# Patient Record
(demographics unavailable — no encounter records)

---

## 2025-01-17 NOTE — CONSULT LETTER
[Dear  ___] : Dear  [unfilled], [Courtesy Letter:] : I had the pleasure of seeing your patient, [unfilled], in my office today. [Sincerely,] : Sincerely, [FreeTextEntry3] : Rusty Miles MD, PhD, FRCPSC  Attending Neurosurgeon   of Neurosurgery  [facility]  [address], [address]nd [place] [address]  Office: [phone]  Fax: [phone]

## 2025-01-18 NOTE — HISTORY OF PRESENT ILLNESS
[Home] : at home, [unfilled] , at the time of the visit. [Other Location: e.g. Home (Enter Location, City,State)___] : at [unfilled] [Verbal consent obtained from patient] : the patient, [unfilled] [FreeTextEntry1] : f/u post hospitalization at  from  1/1-9 for placement.  [de-identified] : Brief Hospital Course copied: 41 year old male with ADD, Polysubstance abuse, recent prolonged stay at  for Right sided ICH sp emergent craniotomy and decompression, subsequent hygroma with evacuation 11/21, found to have a surgical site hematoma sp needle aspiration found to be growing Enterobacter, currently on Meropenem, SP peg placement, and insertion of  shunt for NPH on 12/26 was discharged to [facility] on 12/30 but was refused acceptance once he got there, stating he is walking to well to be admitted to a SNF. The patient unfortunately has nowhere to go so he was sent back to  for placement. Currently patient with baseline confusion, oriented x 2. No fever.  Telephone visit with pt he is awake and alert. Visit done by telephone as pt refused video visit stating it has to be quick, agreed for telephone visit. States he is still having chest pains and headaches. Pt had shunt placed for Acquired Obstructive Hydrocephalus with intracranial hemorrhage, s/p fall. He was seen in the ER on 1/15 for same. Labs done with no changes, shunt placement intact. No swelling or clog noted at the site. Pt was instructed to f/u with his NEURO SURG. Appt is for 1/22.  Pt reports unsteady gait and  balance, uses standard walker. Instructed to maintain safety, ask for assistance. To use a shower chair when showering - just got one.  He reports appetite varies. Encourages him to eat soft foods, that are easy to chew and swallow. To maintain adequate fluid intake. To alternate periods of activity

## 2025-01-18 NOTE — ASSESSMENT
[FreeTextEntry1] :  for Right sided ICH sp emergent craniotomy and decompression, subsequent hygroma with evacuation 11/21, found to have a surgical site hematoma sp needle aspiration found to be growing Enterobacter, currently on Meropenem, SP peg placement, and insertion of  shunt for NPH on 12/26 was discharged to [facility] on 12/30 but was refused acceptance once he got there, stating he is walking to well to be admitted to a SNF. The patient unfortunately has nowhere to go so he was sent back to  for placement. Currently patient with baseline confusion, oriented x 2. No fever.

## 2025-01-18 NOTE — PLAN
[FreeTextEntry1] : 1. cont all medications as prescribed 2. keep f/u appt with NEURO SURG 3. maintain fall precautions 4. maintain adequate nutrition and hydration MalePresents to emergency with complaints ofone point pain on the right side On examination patient is laying comfortably on his abdomen and sleepingOutpatient follow-up with surgery.Recommended

## 2025-01-18 NOTE — HISTORY OF PRESENT ILLNESS
[Home] : at home, [unfilled] , at the time of the visit. [Other Location: e.g. Home (Enter Location, City,State)___] : at [unfilled] [Verbal consent obtained from patient] : the patient, [unfilled] [FreeTextEntry1] : f/u post hospitalization at  from  1/1-9 for placement.  [de-identified] : Brief Hospital Course copied: 41 year old male with ADD, Polysubstance abuse, recent prolonged stay at  for Right sided ICH sp emergent craniotomy and decompression, subsequent hygroma with evacuation 11/21, found to have a surgical site hematoma sp needle aspiration found to be growing Enterobacter, currently on Meropenem, SP peg placement, and insertion of  shunt for NPH on 12/26 was discharged to [facility] on 12/30 but was refused acceptance once he got there, stating he is walking to well to be admitted to a SNF. The patient unfortunately has nowhere to go so he was sent back to  for placement. Currently patient with baseline confusion, oriented x 2. No fever.  Telephone visit with pt he is awake and alert. Visit done by telephone as pt refused video visit stating it has to be quick, agreed for telephone visit. States he is still having chest pains and headaches. Pt had shunt placed for Acquired Obstructive Hydrocephalus with intracranial hemorrhage, s/p fall. He was seen in the ER on 1/15 for same. Labs done with no changes, shunt placement intact. No swelling or clog noted at the site. Pt was instructed to f/u with his NEURO SURG. Appt is for 1/22.  Pt reports unsteady gait and  balance, uses standard walker. Instructed to maintain safety, ask for assistance. To use a shower chair when showering - just got one.  He reports appetite varies. Encourages him to eat soft foods, that are easy to chew and swallow. To maintain adequate fluid intake. To alternate periods of activity

## 2025-01-18 NOTE — PLAN
[FreeTextEntry1] : 1. cont all medications as prescribed 2. keep f/u appt with NEURO SURG 3. maintain fall precautions 4. maintain adequate nutrition and hydration

## 2025-01-18 NOTE — ASSESSMENT
[FreeTextEntry1] :  for Right sided ICH sp emergent craniotomy and decompression, subsequent hygroma with evacuation 11/21, found to have a surgical site hematoma sp needle aspiration found to be growing Enterobacter, currently on Meropenem, SP peg placement, and insertion of  shunt for NPH on 12/26 was discharged to [facility] on 12/30 but was refused acceptance once he got there, stating he is walking to well to be admitted to a SNF. The patient unfortunately has nowhere to go so he was sent back to  for placement. Currently patient with baseline confusion, oriented x 2. No fever.

## 2025-01-18 NOTE — HISTORY OF PRESENT ILLNESS
[Home] : at home, [unfilled] , at the time of the visit. [Other Location: e.g. Home (Enter Location, City,State)___] : at [unfilled] [Verbal consent obtained from patient] : the patient, [unfilled] [FreeTextEntry1] : f/u post hospitalization at  from  1/1-9 for placement.  [de-identified] : Brief Hospital Course copied: 41 year old male with ADD, Polysubstance abuse, recent prolonged stay at  for Right sided ICH sp emergent craniotomy and decompression, subsequent hygroma with evacuation 11/21, found to have a surgical site hematoma sp needle aspiration found to be growing Enterobacter, currently on Meropenem, SP peg placement, and insertion of  shunt for NPH on 12/26 was discharged to [facility] on 12/30 but was refused acceptance once he got there, stating he is walking to well to be admitted to a SNF. The patient unfortunately has nowhere to go so he was sent back to  for placement. Currently patient with baseline confusion, oriented x 2. No fever.  Telephone visit with pt he is awake and alert. Visit done by telephone as pt refused video visit stating it has to be quick, agreed for telephone visit. States he is still having chest pains and headaches. Pt had shunt placed for Acquired Obstructive Hydrocephalus with intracranial hemorrhage, s/p fall. He was seen in the ER on 1/15 for same. Labs done with no changes, shunt placement intact. No swelling or clog noted at the site. Pt was instructed to f/u with his NEURO SURG. Appt is for 1/22.  Pt reports unsteady gait and  balance, uses standard walker. Instructed to maintain safety, ask for assistance. To use a shower chair when showering - just got one.  He reports appetite varies. Encourages him to eat soft foods, that are easy to chew and swallow. To maintain adequate fluid intake. To alternate periods of activity

## 2025-01-18 NOTE — COUNSELING
[Fall prevention counseling provided] : Fall prevention counseling provided [Use proper foot wear] : Use proper foot wear [Use recommended devices] : Use recommended devices [None] : None [Good understanding] : Patient has a good understanding of lifestyle changes and steps needed to achieve self management goal

## 2025-01-18 NOTE — REVIEW OF SYSTEMS
[Constipation] : constipation [Muscle Weakness] : muscle weakness [Unsteady Walk] : ataxia [Negative] : Heme/Lymph [Fever] : no fever [Chills] : no chills [Fatigue] : no fatigue [Vision Problems] : no vision problems [Hearing Loss] : no hearing loss [Chest Pain] : no chest pain [Lower Ext Edema] : no lower extremity edema [Shortness Of Breath] : no shortness of breath [Cough] : no cough [Incontinence] : no incontinence [Joint Pain] : no joint pain [Muscle Pain] : no muscle pain [Insomnia] : no insomnia

## 2025-01-18 NOTE — ASSESSMENT
[FreeTextEntry1] :  for Right sided ICH sp emergent craniotomy and decompression, subsequent hygroma with evacuation 11/21, found to have a surgical site hematoma sp needle aspiration found to be growing Enterobacter, currently on Meropenem, SP peg placement, and insertion of  shunt for NPH on 12/26 was discharged to [facility] on 12/30 but was refused acceptance once he got there, stating he is walking to well to be admitted to a SNF. The patient unfortunately has nowhere to go so he was sent back to  for placement. Currently patient with baseline confusion, oriented x 2. No fever.

## 2025-02-10 NOTE — PHYSICAL EXAM
[de-identified] : NAD [de-identified] : Head wrap in place with area of sanguineous strikethrough at right pre auricular region.  Head wrap removed with incision healing well with sutures in place.  <1cm area at anterior most incision with small break in the skin from bumping his head.  No erythema, dehiscence, drainage, or signs of infection noted  [de-identified] : Unlabored breathing

## 2025-02-10 NOTE — ASSESSMENT
[FreeTextEntry1] : Mr. Linares is a 42 year old male with history of polysubstance abuse who was recently admitted to [facility] from 11/7/24-12/31/24. During this time he underwent a right craniotomy for evacuation of ICH, revision of R craniotomy with evacuation of subdural hygroma, R temporal needle drainage of IC collection, and L frontal EVD insertion. He then presented to the emergency room complaining of a small opening in his R craniotomy incision with serous drainage. HCT, CT A/P, and brain MRI all consistent with superficial collection. He is now s/p right scalp wound revision, washout, debridement, and local tissue rearrangement for closure in conjunction with Dr. Miles from Neurosurgery on 1/28/25.  He reports doing well since discharge and pain has been well controlled.  His mother reports that he has bumped his head a few times at home causing some bleeding from the incision. Incision healing well with small break in skin at anterior most incision where he bumped his head.  Otherwise incision is healing well, with no dehiscence or signs of infection.  Continue to keep incision clean and dry.  Okay to shower and pat incision dry (do not scrub incisions).  Keep head covered since he has been bumping his head and causing the area to bleed - this will give another layer of protection.  Follow up in 1 month.  All his questions were answered.

## 2025-02-10 NOTE — HISTORY OF PRESENT ILLNESS
[FreeTextEntry1] : Mr. Linares is a 42 year old male with history of polysubstance abuse who was recently admitted to [facility] from 11/7/24-12/31/24. During this time he underwent a right craniotomy for evacuation of ICH, revision of R craniotomy with evacuation of subdural hygroma, R temporal needle drainage of IC collection, and L frontal EVD insertion. He then presented to the emergency room complaining of a small opening in his R craniotomy incision with serous drainage. HCT, CT A/P, and brain MRI all consistent with superficial collection. He is now s/p right scalp wound revision, washout, debridement, and local tissue rearrangement for closure in conjunction with Dr. Miles from Neurosurgery on 1/28/25.  He reports doing well since discharge and pain has been well controlled.  His mother reports that he has bumped his head a few times at home causing some bleeding from the incision.  Denies fevers, chills, drainage, redness.

## 2025-02-12 NOTE — HISTORY OF PRESENT ILLNESS
[FreeTextEntry1] : Anthony is a 40-year-old male who is presenting for follow-up visit after being seen in the hospital by our service for PEG tube placement.  He has a history of decompensated cirrhosis with prior variceal bleeding.  He had a prolonged hospitalization after head trauma with brain bleed.  He has recovered and his PEG tube was subsequently removed prior to discharge from the hospital 2 weeks prior.  He is present in the office with his mom who helps to provide additional history.  His mom mention that she is concerned about his ammonia level.  He was on lactulose but felt that this was causing him a lot of diarrhea so he does not take it very often.  His issues with loose stools have predated his cirrhosis diagnosis.  He is also taking rifaximin for hepatic encephalopathy.  He takes propranolol 10 mg 3 times daily for variceal prophylaxis.  His mother was concerned about some redness around the site of the PEG tube where it was removed.  Appetite since discharge has been slowly improving.  Patient still with some residual lightheadedness and dizziness after neurosurgery.

## 2025-02-12 NOTE — REVIEW OF SYSTEMS
[Feeling Poorly] : feeling poorly [Feeling Tired] : feeling tired [Diarrhea] : diarrhea [Dizziness] : dizziness [Negative] : Endocrine [Fever] : no fever [Chills] : no chills [Recent Weight Gain (___ Lbs)] : no recent weight gain [Recent Weight Loss (___ Lbs)] : no recent weight loss [Abdominal Pain] : no abdominal pain [Vomiting] : no vomiting [Constipation] : no constipation [Heartburn] : no heartburn [Melena (black stool)] : no melena [Bleeding] : no bleeding [Fecal Incontinence (soiling)] : no fecal incontinence [Bloating (gassiness)] : no bloating [Confused] : no confusion [Convulsions] : no convulsions [Fainting] : no fainting [Limb Weakness] : no limb weakness [Difficulty Walking] : no difficulty walking

## 2025-02-12 NOTE — PHYSICAL EXAM
[Normal] : normal bowel sounds, non-tender, no masses, soft, no no hepato-splenomegaly [Oriented To Time, Place, And Person] : oriented to person, place, and time [de-identified] : Site of Peg with normal healing

## 2025-02-12 NOTE — ASSESSMENT
[FreeTextEntry1] : Oliver is a 42-year-old male who is presenting for a follow-up visit for history of decompensated cirrhosis.   1.  Decompensated alcoholic cirrhosis: With prior history of variceal bleeding and hepatic encephalopathy.  Patient is on rifaximin and due to diarrhea was not regularly taking lactulose.  Is also on propranolol for secondary prophylaxis for varices.  -Continue rifaximin, counseled that if lactulose was causing significant diarrhea could switch to MiraLAX.  Goal is for 2-3 soft bowel movements per day -Counseled on stages of encephalopathy for patient's mother to be aware -Continue propranolol for prophylaxis  -Will benefit from repeat EGD but discussed with patient and family to hold off until further recovers from recent prolonged hospitalization -Counseled on the importance of continued abstinence -High-protein diet  2.  History of variceal bleeding: Was admitted at outside hospital for hematemesis and found to have esophageal varices status post banding.  No further episodes of bleeding since that episode.  Did have follow-up endoscopy in the hospital when PEG tube was placed.  -Will benefit from continued use of propranolol for secondary prophylaxis -Will discuss repeat EGD at follow-up visit  3. Hepatic Encephalopathy -Continue rifaximin -Ok to switch to miralax - titrate to 2-3 soft BM per day  4. Discomfort around PEG site: peg removed in hospital, site with appropriate granulation tissue and healing -Supportive care, can keep bandage over site as continues to heal  Follow up in 3 months

## 2025-02-21 NOTE — CONSULT LETTER
[Dear  ___] : Dear  [unfilled], [Courtesy Letter:] : I had the pleasure of seeing your patient, [unfilled], in my office today. [Sincerely,] : Sincerely, [FreeTextEntry1] : Anthony Linares is a 42-year-old male who presents today for a follow-up visit.  Patient has a long complicated surgical history.  Patient has history of polysubstance abuse including EtOH.  Patient underwent a right craniotomy for evacuation for intracerebral hemorrhage on 11/8/2025 followed by revision of the right craniotomy with evacuation of subdural hygroma on 11/21/2024.  He then underwent a right temporal needle drainage of collection on 11/29/2024.  Cultures revealed positive Enterobacter/Enterococcus.  He then underwent a left frontal EVD insertion on 12/2/2024 with a change of the EVD on 12/14/2024.  He finally underwent a  shunt placement on 12/26/2024.  Patient has a Certa shunt set at 6.  Patient was also recently seen in the hospital for small opening in the right craniotomy incision site.  He had underwent wound revision, washout debridement and closure on 1/28/2025.  He was recently evaluated by his plastic surgeon for a small opening at the previous site.  As per plastic office note patient will be scheduled for possible washout and closure of the incision next week.  Patient presents today with his mother with concerns regarding bilateral leg swelling and diarrhea.  Bilateral leg swelling started approximately 5 days ago.  Patient indicates he has history of bilateral leg swelling however this has worsened.  He denies any chest pain or shortness of breath at this time.   Denies calf pain.  Patient also endorses diarrhea.  Mom indicates patient has been taking lactulose.  They will contact GI in regards to this symptom.  Patient also with recent history of PEG placement.  Patient denies nausea or vomiting.  Patient reports difficulties with short-term memory.  He reports worsening gait and difficulties using stairs.  He reports headaches primarily in the morning.  He reports these have remained stable.  He takes Motrin with temporary benefit.  He reports difficulties with peripheral vision which has been chronic.  He is scheduled to see an ophthalmologist.  He reports generalized weakness.  Patient denies urine incontinence.  He denies fevers.  He reports occasional chills.  He reports dizziness.  Patient is alert and oriented.  No distress noted.  Cranial nerves intact.  EOMI.  PERRL.  Speech intact.  Strength to bilateral upper and lower extremities 5/5.  Negative Romberg's.  Negative pronator drift.  Certa shunt confirmed at 6.  Abdomen nontender.  At this time I have not arranged for a follow-up.  Plastics has been in contact with Dr. Miles regarding suggested washout and closure procedure to be performed in the near future.  Patient and mom aware to call with any further questions or concerns or with any new or worsening symptoms.  I have also recommended GI in regards to diarrhea.  I have also recommended primary care doctor for his bilateral leg swelling.  Patient aware of signs and symptoms that would warrant an ER visit. [FreeTextEntry3] :  Erika Kim, MSN, [facility]-BC Nurse Practitioner Neurosurgery [address]nd [place] [address] Office: [phone] Fax: [phone] No

## 2025-02-21 NOTE — CONSULT LETTER
[Dear  ___] : Dear  [unfilled], [Courtesy Letter:] : I had the pleasure of seeing your patient, [unfilled], in my office today. [Sincerely,] : Sincerely, [FreeTextEntry1] : Anthony Linares is a 42-year-old male who presents today for a follow-up visit.  Patient has a long complicated surgical history.  Patient has history of polysubstance abuse including EtOH.  Patient underwent a right craniotomy for evacuation for intracerebral hemorrhage on 11/8/2025 followed by revision of the right craniotomy with evacuation of subdural hygroma on 11/21/2024.  He then underwent a right temporal needle drainage of collection on 11/29/2024.  Cultures revealed positive Enterobacter/Enterococcus.  He then underwent a left frontal EVD insertion on 12/2/2024 with a change of the EVD on 12/14/2024.  He finally underwent a  shunt placement on 12/26/2024.  Patient has a Certa shunt set at 6.  Patient was also recently seen in the hospital for small opening in the right craniotomy incision site.  He had underwent wound revision, washout debridement and closure on 1/28/2025.  He was recently evaluated by his plastic surgeon for a small opening at the previous site.  As per plastic office note patient will be scheduled for possible washout and closure of the incision next week.  Patient presents today with his mother with concerns regarding bilateral leg swelling and diarrhea.  Bilateral leg swelling started approximately 5 days ago.  Patient indicates he has history of bilateral leg swelling however this has worsened.  He denies any chest pain or shortness of breath at this time.   Denies calf pain.  Patient also endorses diarrhea.  Mom indicates patient has been taking lactulose.  They will contact GI in regards to this symptom.  Patient also with recent history of PEG placement.  Patient denies nausea or vomiting.  Patient reports difficulties with short-term memory.  He reports worsening gait and difficulties using stairs.  He reports headaches primarily in the morning.  He reports these have remained stable.  He takes Motrin with temporary benefit.  He reports difficulties with peripheral vision which has been chronic.  He is scheduled to see an ophthalmologist.  He reports generalized weakness.  Patient denies urine incontinence.  He denies fevers.  He reports occasional chills.  He reports dizziness.  Patient is alert and oriented.  No distress noted.  Cranial nerves intact.  EOMI.  PERRL.  Speech intact.  Strength to bilateral upper and lower extremities 5/5.  Negative Romberg's.  Negative pronator drift.  Certa shunt confirmed at 6.  Abdomen nontender.  At this time I have not arranged for a follow-up.  Plastics has been in contact with Dr. Miles regarding suggested washout and closure procedure to be performed in the near future.  Patient and mom aware to call with any further questions or concerns or with any new or worsening symptoms.  I have also recommended GI in regards to diarrhea.  I have also recommended primary care doctor for his bilateral leg swelling.  Patient aware of signs and symptoms that would warrant an ER visit. [FreeTextEntry3] :  Erika Kim, MSN, [facility]-BC Nurse Practitioner Neurosurgery [address]nd [place] [address] Office: [phone] Fax: [phone]

## 2025-02-24 NOTE — HISTORY OF PRESENT ILLNESS
[FreeTextEntry1] : Mr. Linares is a 42 year old male with history of polysubstance abuse who was recently admitted to [facility] from 11/7/24-12/31/24. During this time he underwent a right craniotomy for evacuation of ICH, revision of R craniotomy with evacuation of subdural hygroma, R temporal needle drainage of IC collection, and L frontal EVD insertion. He then presented to the emergency room complaining of a small opening in his R craniotomy incision with serous drainage. HCT, CT A/P, and brain MRI all consistent with superficial collection. He is now s/p right scalp wound revision, washout, debridement, and local tissue rearrangement for closure in conjunction with Dr. Miles from Neurosurgery on 1/28/25. He presents for follow up. His mother reports that he has bumped his head a few times at home causing some bleeding from the incision.  She also reports that a small area of the incision this is getting thin.  She has been changing the head wrap daily.  Denies fevers, chills, drainage, redness.

## 2025-02-24 NOTE — PHYSICAL EXAM
[de-identified] : NAD [de-identified] : Head wrap removed with incision healing, <1cm area of thinning skin at previous dehiscence site, no erythema or purulence or signs of infection.  Anterior incision with some scabbing but mostly healed, no purulence.    [de-identified] : Unlabored breathing

## 2025-02-24 NOTE — HISTORY OF PRESENT ILLNESS
[FreeTextEntry1] : Mr. Linares is a 42 year old male with history of polysubstance abuse who was recently admitted to [facility] from 11/7/24-12/31/24. During this time he underwent a right craniotomy for evacuation of ICH, revision of R craniotomy with evacuation of subdural hygroma, R temporal needle drainage of IC collection, and L frontal EVD insertion. He then presented to the emergency room complaining of a small opening in his R craniotomy incision with serous drainage. HCT, CT A/P, and brain MRI all consistent with superficial collection. He is now s/p right scalp wound revision, washout, debridement, and local tissue rearrangement for closure in conjunction with Dr. Miles from Neurosurgery on 1/28/25. He presents for follow up. His mother reports that he has bumped his head a few times at home causing some bleeding from the incision.  She also reports that a small area of the incision has started to open up.  She has been changing the head wrap daily.  Denies fevers, chills, drainage, redness.    Patient and mother also report bilateral leg swelling for the past week.  This was noticed at PST this morning, and patient told to go to ED, but wanted to come to this appointment first.  Mother also reports patient has been consuming a lot of alcohol at home.

## 2025-02-24 NOTE — PHYSICAL EXAM
[de-identified] : NAD [de-identified] : Head wrap removed with incision healing, <1cm opening at previous dehiscence site, no erythema or purulence or signs of infection.  Anterior incision with some scabbing but mostly healed, no purulence.    [de-identified] : Unlabored breathing

## 2025-02-24 NOTE — PHYSICAL EXAM
[de-identified] : NAD [de-identified] : Head wrap removed with incision healing, <1cm area of thinning skin at previous dehiscence site, no erythema or purulence or signs of infection.  Anterior incision with some scabbing but mostly healed, no purulence.    [de-identified] : Unlabored breathing

## 2025-02-24 NOTE — ASSESSMENT
[FreeTextEntry1] : Mr. Linares is a 42 year old male with history of polysubstance abuse who was recently admitted to [facility] from 11/7/24-12/31/24. During this time he underwent a right craniotomy for evacuation of ICH, revision of R craniotomy with evacuation of subdural hygroma, R temporal needle drainage of IC collection, and L frontal EVD insertion. He then presented to the emergency room complaining of a small opening in his R craniotomy incision with serous drainage. HCT, CT A/P, and brain MRI all consistent with superficial collection. He is now s/p right scalp wound revision, washout, debridement, and local tissue rearrangement for closure in conjunction with Dr. Miles from Neurosurgery on 1/28/25. His mother reports that he has bumped his head a few times at home causing some bleeding from the incision. Incision with small <1cm opening at previous area of dehiscence, no purulence or signs of infection. Continue to keep incision clean and dry.  Patient is planning to see Dr. Miles tomorrow.  Will discuss with Dr. Miles and plan for possible washout and closure within the next week.  Patient to call or come in if opening gets bigger or any signs of infection.  All questions were answered.

## 2025-02-24 NOTE — ASSESSMENT
[FreeTextEntry1] : Mr. Linares is a 42 year old male with history of polysubstance abuse who was recently admitted to [facility] from 11/7/24-12/31/24. During this time he underwent a right craniotomy for evacuation of ICH, revision of R craniotomy with evacuation of subdural hygroma, R temporal needle drainage of IC collection, and L frontal EVD insertion. He then presented to the emergency room complaining of a small opening in his R craniotomy incision with serous drainage. HCT, CT A/P, and brain MRI all consistent with superficial collection. He is now s/p right scalp wound revision, washout, debridement, and local tissue rearrangement for closure in conjunction with Dr. Miles from Neurosurgery on 1/28/25. His mother reports that he has bumped his head a few times at home causing some bleeding from the incision. Incision with small <1cm opening at previous area of dehiscence, no purulence or signs of infection. Continue to keep incision clean and dry.  Patient is planning to see Dr. Miles tomorrow.  Will discuss with Dr. Miles and plan for possible washout and closure within the next week.  Patient to call or come in if opening gets bigger or any signs of infection.  All questions were answered.

## 2025-02-24 NOTE — HISTORY OF PRESENT ILLNESS
Known [FreeTextEntry1] : Mr. Linares is a 42 year old male with history of polysubstance abuse who was recently admitted to [facility] from 11/7/24-12/31/24. During this time he underwent a right craniotomy for evacuation of ICH, revision of R craniotomy with evacuation of subdural hygroma, R temporal needle drainage of IC collection, and L frontal EVD insertion. He then presented to the emergency room complaining of a small opening in his R craniotomy incision with serous drainage. HCT, CT A/P, and brain MRI all consistent with superficial collection. He is now s/p right scalp wound revision, washout, debridement, and local tissue rearrangement for closure in conjunction with Dr. Miles from Neurosurgery on 1/28/25. He presents for follow up. His mother reports that he has bumped his head a few times at home causing some bleeding from the incision.  She also reports that a small area of the incision this is getting thin.  She has been changing the head wrap daily.  Denies fevers, chills, drainage, redness.

## 2025-03-03 NOTE — HISTORY OF PRESENT ILLNESS
[FreeTextEntry1] : Mr. Linares is a 42 year old male with history of polysubstance abuse who was recently admitted to [facility] from 11/7/24-12/31/24. During this time he underwent a right craniotomy for evacuation of ICH, revision of R craniotomy with evacuation of subdural hygroma, R temporal needle drainage of IC collection, and L frontal EVD insertion. He then presented to the emergency room complaining of a small opening in his R craniotomy incision with serous drainage. HCT, CT A/P, and brain MRI all consistent with superficial collection. He is now s/p right scalp wound revision, washout, debridement, and local tissue rearrangement for closure in conjunction with Dr. Miles from Neurosurgery on 1/28/25. His course was complicated by dehiscence at the same area, which corresponded with one of the craniotomy plates, and he is now s/p right scalp wound revision, washout, hardware removal and replacement, and local tissue rearrangement.  He presents today because his mother was worried about some bleeding from the incision she noticed this morning.  Denies fevers, chills, drainage, redness.

## 2025-03-03 NOTE — PHYSICAL EXAM
[de-identified] : NAD [de-identified] : Head wrap removed with small area of blood noted on headwrap.  Incision healing well with sutures in place, no bleeding noted from the incision, no areas of dehiscence or opening.  No signs of infection.     [de-identified] : Unlabored breathing

## 2025-03-03 NOTE — ASSESSMENT
[FreeTextEntry1] : Mr. Linares is a 42 year old male with history of polysubstance abuse who was recently admitted to [facility] from 11/7/24-12/31/24. During this time he underwent a right craniotomy for evacuation of ICH, revision of R craniotomy with evacuation of subdural hygroma, R temporal needle drainage of IC collection, and L frontal EVD insertion. He then presented to the emergency room complaining of a small opening in his R craniotomy incision with serous drainage. HCT, CT A/P, and brain MRI all consistent with superficial collection. He is now s/p right scalp wound revision, washout, debridement, and local tissue rearrangement for closure in conjunction with Dr. Miles from Neurosurgery on 1/28/25. His course was complicated by dehiscence at the same area, which corresponded with one of the craniotomy plates, and he is now s/p right scalp wound revision, washout, hardware removal and replacement, and local tissue rearrangement.  He presents today because his mother was worried about some bleeding from the incision she noticed this morning.  Denies fevers, chills, drainage, redness.    The incision is healing well no signs of bleeding, no areas of dehiscence, of signs of infection.  Patient is okay to shower and let warm water wash over the incision, no scrubbing the incision, pat it dry.  Replace dressing and head wrap after showering.  Follow up in 1 week or sooner if any concerns for infection, bleeding, or areas of dehiscence.  All questions were answered, and patient and mother are in agreement with the plan.

## 2025-03-11 NOTE — ASSESSMENT
[FreeTextEntry1] : 42 M presented for follow up visit.   1. Decompensated alcoholic cirrhosis: With prior history of variceal bleeding and hepatic encephalopathy. Patient is on rifaximin and due to diarrhea was not regularly taking lactulose. Is also on propranolol for secondary prophylaxis for varices. -Continue rifaximin, counseled that if lactulose was causing significant diarrhea could switch to MiraLAX. Goal is for 2-3 soft bowel movements per day -Counseled on stages of encephalopathy for patient's mother to be aware -Continue propranolol for prophylaxis -Will benefit from repeat EGD but discussed with patient and family to hold off until further recovers from recent prolonged hospitalization -Counseled on the importance of continued abstinence -High-protein diet -continue diuretics for peripheral edema -Low Na Diet  2. History of variceal bleeding: Was admitted at outside hospital for hematemesis and found to have esophageal varices status post banding. No further episodes of bleeding since that episode. Did have follow-up endoscopy in the hospital when PEG tube was placed. -Will benefit from continued use of propranolol for secondary prophylaxis -Will discuss repeat EGD at follow-up visit  3. Hepatic Encephalopathy -Continue rifaximin -Ok to switch to miralax - titrate to 2-3 soft BM per day

## 2025-03-11 NOTE — HISTORY OF PRESENT ILLNESS
[FreeTextEntry1] : Anthony is a 40-year-old male who is presenting for follow-up visit after being seen in the hospital by our service for PEG tube placement. He has a history of decompensated cirrhosis with prior variceal bleeding. He had a prolonged hospitalization after head trauma with brain bleed. He has recovered and his PEG tube was subsequently removed prior to discharge from the hospital. He followed up after hospitalization with concern for poor appetite and diarrhea. Recommended holding off on lactulose - okay to use miralax BID for encephalopathy and continue rifaximin at that time. Some redness around peg site appeared to be normal granulation tissue. Since last visit he had another hospitalization for hepatic encephalopathy - found to have a GI infection. He was subsequently treated with antibiotics and discharged home. He is present with mom at the office visit who helps to provide additional history. His mental status has overall improved. He is on diuretics which help with lower extremity edema. He has not had an EGD since his initial episode of variceal bleeding. 
Statement Selected

## 2025-03-12 NOTE — PHYSICAL EXAM
[de-identified] : NAD [de-identified] : Head wrap removed with incision healing well with sutures in place, no bleeding noted from the incision, no areas of dehiscence or opening.  No signs of infection.     [de-identified] : Unlabored breathing

## 2025-03-12 NOTE — ASSESSMENT
[FreeTextEntry1] : Mr. Linares is a 42 year old male with history of polysubstance abuse who was recently admitted to [facility] from 11/7/24-12/31/24. During this time he underwent a right craniotomy for evacuation of ICH, revision of R craniotomy with evacuation of subdural hygroma, R temporal needle drainage of IC collection, and L frontal EVD insertion. He then presented to the emergency room complaining of a small opening in his R craniotomy incision with serous drainage. HCT, CT A/P, and brain MRI all consistent with superficial collection. He is now s/p right scalp wound revision, washout, debridement, and local tissue rearrangement for closure in conjunction with Dr. Miles from Neurosurgery on 1/28/25. His course was complicated by dehiscence at the same area, which corresponded with one of the craniotomy plates, and he is now s/p right scalp wound revision, washout, hardware removal and replacement, and local tissue rearrangement (2/25/25).   The incision is healing well no signs of bleeding, no areas of dehiscence, or signs of infection.  Every other suture was removed today.  Patient is okay to shower and let warm water wash over the incision, no scrubbing the incision, pat it dry.  Replace dressing and head wrap after showering.  Follow up in 1 week or sooner if any concerns for infection, bleeding, or areas of dehiscence.  All questions were answered, and patient and mother are in agreement with the plan.

## 2025-03-12 NOTE — HISTORY OF PRESENT ILLNESS
[FreeTextEntry1] : Mr. Linares is a 42 year old male with history of polysubstance abuse who was recently admitted to [facility] from 11/7/24-12/31/24. During this time he underwent a right craniotomy for evacuation of ICH, revision of R craniotomy with evacuation of subdural hygroma, R temporal needle drainage of IC collection, and L frontal EVD insertion. He then presented to the emergency room complaining of a small opening in his R craniotomy incision with serous drainage. HCT, CT A/P, and brain MRI all consistent with superficial collection. He is now s/p right scalp wound revision, washout, debridement, and local tissue rearrangement for closure in conjunction with Dr. Miles from Neurosurgery on 1/28/25. His course was complicated by dehiscence at the same area, which corresponded with one of the craniotomy plates, and he is now s/p right scalp wound revision, washout, hardware removal and replacement, and local tissue rearrangement on 2/25/25.  He presents today for follow up.  Patients mother and visiting nurses have been changing his head wrap and dressings at home. Denies fevers, chills, drainage, redness.

## 2025-03-19 NOTE — PHYSICAL EXAM
[de-identified] : NAD [de-identified] : Head wrap removed with incision healing well with sutures in place, no bleeding noted from the incision, no areas of dehiscence or opening.  No signs of infection.   [de-identified] : Unlabored breathing

## 2025-03-19 NOTE — HISTORY OF PRESENT ILLNESS
[FreeTextEntry1] : Mr. Linares is a 42 year old male with history of polysubstance abuse who was recently admitted to [facility] from 11/7/24-12/31/24. During this time he underwent a right craniotomy for evacuation of ICH, revision of R craniotomy with evacuation of subdural hygroma, R temporal needle drainage of IC collection, and L frontal EVD insertion. He then presented to the emergency room complaining of a small opening in his R craniotomy incision with serous drainage. HCT, CT A/P, and brain MRI all consistent with superficial collection. He is now s/p right scalp wound revision, washout, debridement, and local tissue rearrangement for closure in conjunction with Dr. Miles from Neurosurgery on 1/28/25. His course was complicated by dehiscence at the same area, which corresponded with one of the craniotomy plates, and he is now s/p right scalp wound revision, washout, hardware removal and replacement, and local tissue rearrangement on 2/25/25.  He presents today for follow up.  He reports doing well and in better spirits today.  He has been showering and letting the warm water wash over the incision.  Denies fevers, chills, drainage, redness.

## 2025-03-19 NOTE — ASSESSMENT
[FreeTextEntry1] : Mr. Linraes is a 42 year old male with history of polysubstance abuse who was recently admitted to [facility] from 11/7/24-12/31/24. During this time he underwent a right craniotomy for evacuation of ICH, revision of R craniotomy with evacuation of subdural hygroma, R temporal needle drainage of IC collection, and L frontal EVD insertion. He then presented to the emergency room complaining of a small opening in his R craniotomy incision with serous drainage. HCT, CT A/P, and brain MRI all consistent with superficial collection. He is now s/p right scalp wound revision, washout, debridement, and local tissue rearrangement for closure in conjunction with Dr. Miles from Neurosurgery on 1/28/25. His course was complicated by dehiscence at the same area, which corresponded with one of the craniotomy plates, and he is now s/p right scalp wound revision, washout, hardware removal and replacement, and local tissue rearrangement (2/25/25).   The incision is healing well no signs of bleeding, no areas of dehiscence, or signs of infection.  The remaining sutures were removed today.  Patient should continue to shower and let warm water wash over the incision.  Okay to leave incision open, although would recommend wrapping at bedtime.  Follow up in 2 weeks or sooner if any concerns for infection, bleeding, or areas of dehiscence.  All questions were answered, and patient and mother are in agreement with the plan.

## 2025-04-02 NOTE — ASSESSMENT
[FreeTextEntry1] : Mr. Linares is a 42 year old male with history of polysubstance abuse who was recently admitted to [facility] from 11/7/24-12/31/24. During this time he underwent a right craniotomy for evacuation of ICH, revision of R craniotomy with evacuation of subdural hygroma, R temporal needle drainage of IC collection, and L frontal EVD insertion. He then presented to the emergency room complaining of a small opening in his R craniotomy incision with serous drainage. HCT, CT A/P, and brain MRI all consistent with superficial collection. He is now s/p right scalp wound revision, washout, debridement, and local tissue rearrangement for closure in conjunction with Dr. Miles from Neurosurgery on 1/28/25. His course was complicated by dehiscence at the same area, which corresponded with one of the craniotomy plates, and he is now s/p right scalp wound revision, washout, hardware removal and replacement, and local tissue rearrangement (2/25/25).   The incision is healing well with small area of punctate bleeding after having some pressure on his head although, no areas of dehiscence, or signs of infection.  Patient should continue to shower and let warm water wash over the incision.  Okay to leave incision open, although would recommend wrapping at bedtime.  Follow up in 2 weeks or sooner if any concerns for infection, bleeding, or areas of dehiscence.  All questions were answered, and patient and mother are in agreement with the plan.

## 2025-04-02 NOTE — HISTORY OF PRESENT ILLNESS
[FreeTextEntry1] : Mr. Linares is a 42 year old male with history of polysubstance abuse who was recently admitted to [facility] from 11/7/24-12/31/24. During this time he underwent a right craniotomy for evacuation of ICH, revision of R craniotomy with evacuation of subdural hygroma, R temporal needle drainage of IC collection, and L frontal EVD insertion. He then presented to the emergency room complaining of a small opening in his R craniotomy incision with serous drainage. HCT, CT A/P, and brain MRI all consistent with superficial collection. He is now s/p right scalp wound revision, washout, debridement, and local tissue rearrangement for closure in conjunction with Dr. Miles from Neurosurgery on 1/28/25. His course was complicated by dehiscence at the same area, which corresponded with one of the craniotomy plates, and he is now s/p right scalp wound revision, washout, hardware removal and replacement, and local tissue rearrangement on 2/25/25.  He presents today for follow up.  He reports doing well.  He has been showering and letting the warm water wash over the incision. He just came from the colorectal doctor appointment and noticed a little bleeding from his head when they had him in the head down position, otherwise no issues with the incision.  Denies fevers, chills, drainage, redness.

## 2025-04-09 NOTE — CONSULT LETTER
[Dear  ___] : Dear  [unfilled], [Courtesy Letter:] : I had the pleasure of seeing your patient, [unfilled], in my office today. [Sincerely,] : Sincerely, [FreeTextEntry1] : Anthony is a very pleasant 42-year-old male patient who was seen in our office today approximately 5 months after suffering a traumatic left-sided temporal intracerebral hemorrhage.  I am happy to report that the patient has made a somewhat remarkable recovery.  The patient endorses residual symptoms from his severe injury and multiple neurosurgical interventions including headaches, difficulty focusing, and difficulty with memory.  As a result of the patient's multiple surgical interventions and plastic surgery interventions for wound care, the patient has not yet established care with a neurologist or neuropsychologist for long-term brain injury rehabilitation.  On examination, the patient is alert, oriented, and compliant with the exam.  The patient's incision appears clean, dry, and intact.  The most recent revised to region of his scalp incision is intact.  The patient demonstrates full strength in the upper and lower extremities bilaterally.  The patient's most recent CT scan of the head was performed on February 24, 2025.  These images demonstrated no acute intracranial pathology with resolution of the patient's right temporal hemorrhage and adequate placement of the patient's  shunt.  Taken together, I am gratified to see the patient doing well following his fairly serious intracranial injury.  The patient has been referred to a neurologist for persistent headaches as well as a neuropsychologist for ongoing traumatic brain injury rehab.  The patient has a follow-up with a hepatologist for monitoring of his ongoing liver function.  The patient was started on several medications in the hospital including amantadine, blood pressure medications, and psychiatric medications.  The patient is in the process of obtaining a new primary care physician and will be discussing ongoing medications with them.  I explained to the patient that amantadine in the context of traumatic brain injury is usually weaned off after a month of usage.  However, the patient feels very strongly that the amantadine is helping with his cognitive function.  I recommended that he discuss continuing amantadine with his neurologist, neuropsychologist, and primary care physician and see if there are any other indications for staying on amantadine but I have recommended that he wean off his amantadine with regards to his traumatic brain injury.  The patient has been educated about symptoms to be vigilant for regarding shunt failure or dysfunction and to be in contact with our office should any of the symptoms occur.  The patient will otherwise be following up with our office on an as-needed basis. [FreeTextEntry3] :  Freddy Ko MD, PhD, FRCPSC Attending Neurosurgeon [address], [address] [place] [address] Office: [phone] Fax: [phone]

## 2025-04-16 NOTE — PHYSICAL EXAM
[NI] : Normal [de-identified] : Incision healing well, although no areas of dehiscence or opening. No signs of infection. eschar post auricular measuring 2 cm x 2 cm, no surrounding signs of infection or drainage

## 2025-04-16 NOTE — HISTORY OF PRESENT ILLNESS
[FreeTextEntry1] : Mr. Linares is a 42 year old male with history of polysubstance abuse who was recently admitted to [facility] from 11/7/24-12/31/24. During this time he underwent a right craniotomy for evacuation of ICH, revision of R craniotomy with evacuation of subdural hygroma, R temporal needle drainage of IC collection, and L frontal EVD insertion. He then presented to the emergency room complaining of a small opening in his R craniotomy incision with serous drainage. HCT, CT A/P, and brain MRI all consistent with superficial collection. He is now s/p right scalp wound revision, washout, debridement, and local tissue rearrangement for closure in conjunction with Dr. Miles from Neurosurgery on 1/28/25. His course was complicated by dehiscence at the same area, which corresponded with one of the craniotomy plates, and he is now s/p right scalp wound revision, washout, hardware removal and replacement, and local tissue rearrangement on 2/25/25. He presents today for follow up. He reports doing well. He has been showering and letting the warm water wash over the incision.   Denies fevers, chills, drainage, redness.

## 2025-04-16 NOTE — ASSESSMENT
[FreeTextEntry1] : 43 yo male with scalp wound revision and local tissue rearrangement with Dr. Iniguez 2/25/25 doing ok discussed continue current wound care as per DR. Iniguez continue to shower daily  continue wrapping at night monitor for redness, swelling, fever, chills he is encouraged to call if there are any changes, or with any questions or concerns  all pt questions answered to the best of my ability Follow up in 2 weeks with Dr. Iniguez or sooner if needed

## 2025-04-18 NOTE — HISTORY OF PRESENT ILLNESS
[FreeTextEntry1] : 42-year-old man right-handed with a history of attention disorder, depression anxiety, substance abuse, was in [facility] second half of last year, admitted with a right intracranial hemorrhage ended up with a right craniotomy, for evacuation of subdural hygroma, ended up with a intraventricular drain for hydrocephalus.  Recently in February of this year had a wound revision and tissue rearrangement.  Since has been home. Presently living with his mother and sister.  Has difficulty with attention, feeling fatigue, has not been back to physical therapy or occupational therapy since discharge. Complains of frequent headaches, maybe about twice a week, over the site of the incision and craniotomy.  Interval take Tylenol which helps. Has trouble with sleep which she has had in the past, now on mirtazapine 15 mg daily. He would like to get back to a more normal functioning, he finds his attention difficult to maintain, which send in the past has been on Adderall which was successful.  He would like to restart and denies any substance abuse at the moment, of note while hospitalized at [facility] he tested positive for cocaine.  Also has a history of liver cirrhosis from alcohol.

## 2025-04-18 NOTE — PHYSICAL EXAM
[General Appearance - Alert] : alert [General Appearance - In No Acute Distress] : in no acute distress [Oriented To Time, Place, And Person] : oriented to person, place, and time [Cranial Nerves Oculomotor (III)] : extraocular motion intact [Cranial Nerves Trigeminal (V)] : facial sensation intact symmetrically [Cranial Nerves Facial (VII)] : face symmetrical [Cranial Nerves Vestibulocochlear (VIII)] : hearing was intact bilaterally [Cranial Nerves Glossopharyngeal (IX)] : tongue and palate midline [Cranial Nerves Accessory (XI - Cranial And Spinal)] : head turning and shoulder shrug symmetric [Cranial Nerves Hypoglossal (XII)] : there was no tongue deviation with protrusion [Motor Strength] : muscle strength was normal in all four extremities [Motor Handedness Right-Handed] : the patient is right hand dominant [Sensation Tactile Decrease] : light touch was intact [Abnormal Walk] : normal gait [1+] : Patella left 1+ [0] : Ankle jerk left 0 [FreeTextEntry1] : flat fascies [Person] : oriented to person [Place] : oriented to place [Time] : oriented to time [Naming Objects] : no difficulty naming common objects [Repeating Phrases] : no difficulty repeating a phrase [Motor Tone] : muscle tone was normal in all four extremities [Paresis Pronator Drift Right-Sided] : no pronator drift on the right [Paresis Pronator Drift Left-Sided] : no pronator drift on the left [Tremor] : a tremor present [Dysdiadochokinesia Bilaterally] : not present [Coordination - Dysmetria Impaired Finger-to-Nose Bilateral] : not present [FreeTextEntry4] : Mildly slurred speech [FreeTextEntry8] : Mild kinetic and postural tremor of the upper extremities

## 2025-04-18 NOTE — DISCUSSION/SUMMARY
[FreeTextEntry1] : 42-year-old man with a history of substance abuse, attention deficit disorder, depression, status post right craniotomy, for right ICH drainage, ventriculoperitoneal shunts for obstructive hydrocephalus secondary to the prior.  Recent wound revision for his craniotomy. Complaining of local head pains, which he says getting better.  So far treated with NSAIDs at least twice a week. Complaining of difficulty with attention and concentration which has been his issue long-term but because of the history of substance abuse, cocaine use which he had denies presently using we will try to avoid the stimulants for now. Also recommended he follow-up with primary care because his blood pressure still on the higher side. Plan: Try Qelbree 150 mg po QD Obtain electroencephalograph. Computerized based cognitive testing. Placed referrals to physical therapy, occupational and speech therapy.

## 2025-04-18 NOTE — DATA REVIEWED
[de-identified] : 1 / 1 Juventino Lima,Justen Flores  Report date:2/24/2025 View Order (Report matches exam selected in Patient History pane)     ACC: [id number] EXAM: CT BRAIN ORDERED BY: CORI POLLARD  PROCEDURE DATE: 02/24/2025    INTERPRETATION: .  CLINICAL INFORMATION: Altered mental status.  TECHNIQUE: Multiple axial CT images of the head were obtained without contrast. Sagittal and coronal reconstructed images were acquired from the source data.  COMPARISON: Prior contrast enhanced brain MRI study from 1/21/2025. Prior CT study from 1/19/2025.  FINDINGS: A left-sided anterior approach  shunt catheter is unchanged in position. Ventricular size and configuration is also unchanged and remains minimally dilated.  Right-sided craniotomy changes are again seen with underlying encephalomalacia and gliosis within the right temporal lobe which appears unchanged. Associated ex vacuo dilatation of the right temporal horn is notable. Previously seen underlying abscess collection within the encephalomalacia bed was better evaluated on the prior contrast enhanced brain MRI study. Previously seen soft tissue defect/draining sinus tract has involuted.  There is no acute intracranial hemorrhage. There is no shift of the midline structures or herniation.  The paranasal sinuses and tympanomastoid cavities are clear. The orbits appear unremarkable.  IMPRESSION: No acute intracranial findings.  Unchanged ventricular size and configuration status post placement of a left-sided  shunt catheter in comparison with 1/19/2025.  Other additional unchanged chronic intracranial findings, as discussed.  If clinical symptoms are new and persistent, consider further evaluation with contrast-enhanced brain MRI examination, if there are no MRI contraindications.  --- End of Report ---      JUSTEN ELLIS MD,JUSTEN NOYOLA MD; Attending Radiologist This document has been electronically signed. Feb 24 2025 2:57PM

## 2025-04-18 NOTE — REVIEW OF SYSTEMS
[Feeling Tired] : feeling tired [Sleep Disturbances] : sleep disturbances [Anxiety] : anxiety [Memory Lapses or Loss] : memory loss [Decr. Concentrating Ability] : decreased concentrating ability [Difficulty with Language] : ~M difficulty with language [Changed Thought Patterns] : changed thought patterns [Hand Weakness] :  hand weakness [Leg Weakness] : leg weakness [Negative] : Heme/Lymph [Seizures] : no convulsions

## 2025-04-30 NOTE — PHYSICAL EXAM
[NI] : Normal [de-identified] : Incision healing well, although no areas of dehiscence or opening. No signs of infection.

## 2025-04-30 NOTE — ASSESSMENT
[FreeTextEntry1] : 43 yo male with scalp wound revision and local tissue rearrangement on 2/25/25 doing ok discussed continue current wound care continue to shower daily  continue wrapping at night monitor for redness, swelling, fever, chills he is encouraged to call if there are any changes, or with any questions or concerns  all pt questions answered Follow up in 1 month

## 2025-05-28 NOTE — ASSESSMENT
[FreeTextEntry1] : 43 yo male with scalp wound revision and local tissue rearrangement on 2/25/25 doing well with no issues today continue to shower daily  monitor for redness, swelling, fever, chills he is encouraged to call if there are any changes, or with any questions or concerns  all pt questions answered Follow up in 2 months

## 2025-05-28 NOTE — HISTORY OF PRESENT ILLNESS
[FreeTextEntry1] : Mr. Linares is a 42 year old male with history of polysubstance abuse who was recently admitted to [facility] from 11/7/24-12/31/24. During this time he underwent a right craniotomy for evacuation of ICH, revision of R craniotomy with evacuation of subdural hygroma, R temporal needle drainage of IC collection, and L frontal EVD insertion. He then presented to the emergency room complaining of a small opening in his R craniotomy incision with serous drainage. HCT, CT A/P, and brain MRI all consistent with superficial collection. He is now s/p right scalp wound revision, washout, debridement, and local tissue rearrangement for closure in conjunction with Dr. Miles from Neurosurgery on 1/28/25. His course was complicated by dehiscence at the same area, which corresponded with one of the craniotomy plates, and he is now s/p right scalp wound revision, washout, hardware removal and replacement, and local tissue rearrangement on 2/25/25. He presents today for follow up. He reports doing well. He has been showering and letting the warm water wash over the incision.   Denies fevers, chills, drainage, redness.

## 2025-05-28 NOTE — PHYSICAL EXAM
[NI] : Normal [de-identified] : Incision healing well, although no areas of dehiscence or opening. No signs of infection.

## 2025-06-13 NOTE — HEALTH RISK ASSESSMENT
[Yes] : Yes [4 or more  times a week (4 pts)] : 4 or more  times a week (4 points) [No] : In the past 12 months have you used drugs other than those required for medical reasons? No [Never] : Never [7 to 9 (3 pts)] : 7 to 9 (3  points) [Daily or almost daily (4 pts)] : Daily or almost daily (4 points) [With Family] : lives with family [On disability] : on disability [Single] : single [Reports changes in vision] : Reports changes in vision [Audit-CScore] : 11 [de-identified] : none [de-identified] : barely eats food [EyeExamDate] : 05/2025 [de-identified] : blind

## 2025-06-13 NOTE — ASSESSMENT
[Vaccines Reviewed] : Immunizations reviewed today. Please see immunization details in the vaccine log within the immunization flowsheet.  [FreeTextEntry1] : Total time spent caring for this patient was 60 minutes. This includes time spent before the visit reviewing the chart, previous labs, consults, Hospital records, time spent during the visit reviewing current and past medical hx, time spent explain need to go to ER, trying to organize CT and time spent after the visit on documentation

## 2025-06-13 NOTE — HISTORY OF PRESENT ILLNESS
[de-identified] : 42y male w/ a PMHx of polysubstance abuse, ETOH abuse, liver cirrhosis with hx EV s/p banding x5, HTN, anxiety, depression, Hx Left tibia fibula fracture s/p ORIF, and recent ICH s/p craniotomy with evacuation of hygroma (11/2024), c/b obstructive hydrocephalus s/p  shunt placement as well as revision of craniotomy (12/2024), further c/b open scalp wound requiring debridement (1/28/25), s/p PEG tube now removed  He is presenting today with his mom.  He lives with his mom and is on disability  He reports having severe intermittent abdominal pain, comes in waves.  He has loose stool.  He has blood in stool once a week. He is an alcoholic.  Last drink was yesterday.  He drinks at least 12 cans of beer a day. He was also drinking alot of vodka. He states he is depressed and drinks to escape life.  He states he does not want to die.   Mom reports moments of slurred speech  He has been in AA and alcoholic rehab before.    He does not have a psychiatrist and does not think his psych meds help.  He has been in therapy before and has been on alot of meds. Patient is being followed by [facility]

## 2025-06-13 NOTE — PHYSICAL EXAM
[Normal] : normal rate, regular rhythm, normal S1 and S2 and no murmur heard [de-identified] : jaundice [de-identified] : distended, hepatomegaly, striae,

## 2025-06-13 NOTE — COUNSELING
[AUDIT-C Screening administered and reviewed] : AUDIT-C Screening administered and reviewed [Hazards of at-risk alcohol use discussed] : Hazards of at-risk alcohol use discussed [Strategies to reduce or eliminate alcohol use discussed] : Strategies to reduce or eliminate alcohol use discussed [FreeTextEntry1] : 10

## 2025-06-20 NOTE — REVIEW OF SYSTEMS
[Diarrhea] : diarrhea [Depression] : depression [Negative] : Heme/Lymph [Abdominal Pain] : no abdominal pain [Vomiting] : no vomiting [Constipation] : no constipation [Heartburn] : no heartburn [Melena (black stool)] : no melena [Bleeding] : no bleeding [Fecal Incontinence (soiling)] : no fecal incontinence [Bloating (gassiness)] : no bloating [Suicidal] : not suicidal [Sleep Disturbances] : no sleep disturbances [Anxiety] : no anxiety [Emotional Problems] : no emotional problems

## 2025-06-20 NOTE — HISTORY OF PRESENT ILLNESS
[FreeTextEntry1] : Anthony is a 42 -year-old male who is presenting for follow-up visit after being seen in the hospital by our service for PEG tube placement. He has a history of decompensated cirrhosis with prior variceal bleeding. He had a prolonged hospitalization after head trauma with brain bleed. He has recovered and his PEG tube was subsequently removed prior to discharge from the hospital. He followed up after hospitalization with concern for poor appetite and diarrhea. Recommended holding off on lactulose - okay to use miralax BID for encephalopathy and continue rifaximin at that time. Some redness around peg site appeared to be normal granulation tissue. Since last visit he had another hospitalization for hepatic encephalopathy - found to have a GI infection. He was subsequently treated with antibiotics and discharged home. He is present with mom at the office visit who helps to provide additional history. His mental status has overall improved. He is on diuretics which help with lower extremity edema. He presents today for follow up visit. His mother is present at the visit who helps provide additional history. They were discussing his continued alcohol use. He reported that he does not see himself stopping alcohol use. He reports that he does not want to die but he does not find much to live for at the moment. He feels very depressed. Denies any SI. He does want to get help if he finds the right resources. He also complains that his stools have been on the looser side as well so has been wanting to get further evaluation. Patient also endorses itching as well.

## 2025-06-20 NOTE — ASSESSMENT
[FreeTextEntry1] : 42 M presented for follow up visit.  1. Decompensated alcoholic cirrhosis: With prior history of variceal bleeding and hepatic encephalopathy. Patient is on rifaximin and due to diarrhea was not regularly taking lactulose. Is also on propranolol for secondary prophylaxis for varices. -Continue rifaximin, counseled that if lactulose was causing significant diarrhea could switch to MiraLAX. Goal is for 2-3 soft bowel movements per day -Counseled on stages of encephalopathy for patient's mother to be aware -Continue propranolol for prophylaxis -Scheduled follow up EGD for variceal surveillance -Counseled on the importance of continued abstinence, offered resources including behavioral health referral and also considering naltrexone use.  -High-protein diet -continue diuretics for peripheral edema -Low Na Diet  2. History of variceal bleeding: Was admitted at outside hospital for hematemesis and found to have esophageal varices status post banding. No further episodes of bleeding since that episode. Did have follow-up endoscopy in the hospital when PEG tube was placed. -Will benefit from continued use of propranolol for secondary prophylaxis -Scheduled repeat EGD for surveillance  3. Hepatic Encephalopathy -Continue rifaximin -Ok to switch to miralax - titrate to 2-3 soft BM per day.  4. Itching:  -Cholestyramine for cholestatic itching  5. Diarrhea:  -Schedule diagnostic colonoscopy at the time of EGD -Fecal elastasse

## 2025-06-27 NOTE — HISTORY OF PRESENT ILLNESS
[FreeTextEntry1] : VLADIMIR PEREIRA is a 42-year-old M with a PMHx of polysubstance abuse, liver cirrhosis with variceal bleeding, anxiety, depression, recent ICH s/p craniotomy with evacuation of hygroma (11/2024), c/b obstructive hydrocephalus s/p  shunt placement as well as revision of craniotomy (12/2024), further c/b open scalp wound requiring debridement (1/28/25), s/p PEG tube now removed presenting to office for HFU.  Pt was admitted to  6/14-6/17 where he was noted to have hematemesis in ED requiring emergent EGD with variceal banding. He was noted to have LE edema and was placed on Lasix/Spironolactone. US performed 6/17 showed little ascites, no increase from prior CT 6/14. Today he reports continued LE edema. He is taking Cholestyramine BID which helps with the generalized cholestatic itching. He reports 4-5 soft/loose BMs/day. He has stopped taking his Propanolol but cannot recall why. He also notes that his itching seems to worsen when he takes his Xifaxan, so he stopped taking that as well. He does not take lactulose nor MiraLAX, but rather has been taking Imodium BID.   On physical exam there is no asterixis nor significant ascites noted. There is +3 BL LE edema. Patient appears to be mentating well, mother is at bedside for further history. She states patient has been alert and oriented. Patient admits to continued ETOH use, most recently today 1 beer. He denies suicidal ideation and admits to being more depressed recently, will inquire with his prescribing physician re: augmenting his antidepressant medications.  Pt was supposed to provide fecal calprotectin and fecal elastase samples but has not yet done so.   He has a f/u [facility] scheduled for 7/29 with MD Grider.

## 2025-06-27 NOTE — PHYSICAL EXAM
[Alert] : alert [Normal Voice/Communication] : normal voice/communication [No Acute Distress] : no acute distress [Sclera] : the sclera and conjunctiva were normal [Hearing Threshold Finger Rub Not Foster] : hearing was normal [Normal Lips/Gums] : the lips and gums were normal [Normal Appearance] : the appearance of the neck was normal [No Neck Mass] : no neck mass was observed [No Respiratory Distress] : no respiratory distress [No Acc Muscle Use] : no accessory muscle use [Respiration, Rhythm And Depth] : normal respiratory rhythm and effort [Heart Rate And Rhythm] : heart rate was normal and rhythm regular [Bowel Sounds] : normal bowel sounds [Abdomen Tenderness] : non-tender [No Masses] : no abdominal mass palpated [Abdomen Soft] : soft [] : no hepatosplenomegaly [Abnormal Walk] : normal gait [Oriented To Time, Place, And Person] : oriented to person, place, and time [de-identified] : BL LE edema

## 2025-06-27 NOTE — REVIEW OF SYSTEMS
[Feeling Poorly] : feeling poorly [Abdominal Pain] : abdominal pain [Diarrhea] : diarrhea [Limb Swelling] : limb swelling [Itching] : itching [Negative] : Heme/Lymph [Vomiting] : no vomiting [Constipation] : no constipation [Heartburn] : no heartburn [Melena (black stool)] : no melena [Bleeding] : no bleeding [Fecal Incontinence (soiling)] : no fecal incontinence [Bloating (gassiness)] : no bloating [FreeTextEntry7] : generalized

## 2025-06-27 NOTE — ADDENDUM
[FreeTextEntry1] : I, Dr. Cruz, personally performed the evaluation and management (E/M) services for this established patient who presents today with (a) new problem(s)/exacerbation of (an) existing condition(s). That E/M includes conducting the examination, assessing all new/exacerbated conditions, and establishing a new plan of care. Today, my NPP, Monica Cervantes, was here to observe my evaluation and management services for this new problem/exacerbated condition to be followed going forward

## 2025-06-27 NOTE — ASSESSMENT
[FreeTextEntry1] : 42-year-old M presenting for follow up s/p hospitalization for bleeding varices r/t cirrhosis of the liver and ETOH abuse.  Plan: # Cirrhosis  # Itching: Discussed at length the importance of alcohol cessation with an emphasis on the significant risk of mortality with continued ETOH abuse. Provided patient and mother the following information regarding medication regimen in writing with rationales: - STOP Imodium. Increase Cholestyramine from BID to TID which will assist with stool bulking and improve cholestatic itching. - INCREASE diuretics: will have patient take 40 mg lasix/100 mg spironolactone AM and 40 mg lasix/100mg spironolactone PM. - CONTINUE Propranolol with emphasis on its role in preventing variceal bleeding recurrence.  - CONTINUE Xifaxan for hepatic encephalopathy prophylaxis.  - LIMIT Na+ intake to 2g/day. Use caution with processed foods and be sure to check labels. - Complete stool studies as ordered at prior visit.  Will see patient and mother back in office in 2-weeks for close follow up.

## 2025-07-02 NOTE — HISTORY OF PRESENT ILLNESS
[de-identified] : Presents with mom Hospital followup, TCM nurse called initiated 06/19/25 Hospital records reviewed GI record reviewed Patient states that he has low motivation and concentration.  Was being prescribed Adderall from Neuro and wants to continue He is still drinking alcohol. Admits to already drinkign a few beers today. He does not want to stop drinking.  Hospital Course: Discharge Date 17-Jun-2025 Admission Date 14-Jun-2025 01:57 Reason for Admission GI Bleed Hospital Course chief complaint of GI Bleed  HPI: Patient is a 42y male w/ a PMHx of polysubstance abuse, ETOH abuse, liver cirrhosis with hx EV s/p banding x5 in nov 2024, HTN, anxiety, depression, Hx Left tibia fibula fracture s/p ORIF, and recent ICH s/p craniotomy with evacuation of hygroma (11/2024), c/b obstruticve hydrocephalus s/p  shunt placement as well as revision of craniotomy (12/2024), further c/b open scalp wound requiring debridement (1/28/25), s/p PEG tube now removed, admitted 3/2025 with b/l LE swelling 2nd to decompensated liver cirrhosis. Patient present today to ED c/o abdominal pain and distention, dark stools and lower extremity swelling left worst than right. patient reports he last drank EtOH yesterday ( usually 12 beers + a day). No other acute complaints, denies chest pain , SOB, dysuria, HA, dizziness.  In ER, pt had episode of hematemesis. Concern of variceal bleed as he was apparently admitted at [facility] in Nov 2024 with EV s/p banding x5. on call GI Dr Condon called for consultation, seen pt at bedside, plan to take for emergent EGD tonight. Pt s/p Protonix and octreotide IVP, started on gtt.   In ED, VSS, tachycardic to 120-130, afebrile. Labs notable for WBC 10.35, hgb 6.6, plts 156, INR 1.72. Pt ordered for 2 u PRBC but refused blood transfusion, transfusion refusal form sign by patient in presence of Dr Condon and patient's mother.  Hospital course: Pt treated for acute blood loss anemia due to esophageal varice bleed. He received PRBC tranfusions and varices banded during endoscopy. He is s/p octreotide gtt and empiric ceftriaxone. Pt feeling better, no further bleeding, HD stable and eager to go home.   REVIEW OF SYSTEMS: All other review of systems is negative unless indicated above.  Vital Signs Last 24 Hrs T(C): 37 (17 Jun 2025 07:17), Max: 37.2 (16 Jun 2025 22:45) T(F): 98.6 (17 Jun 2025 07:17), Max: 98.9 (16 Jun 2025 22:45) HR: 68 (17 Jun 2025 09:14) (65 - 80) BP: 135/82 (17 Jun 2025 09:14) (113/65 - 135/87) BP(mean): 96 (16 Jun 2025 16:00) (94 - 103) RR: 18 (17 Jun 2025 09:14) (14 - 19) SpO2: 98% (17 Jun 2025 09:14) (94% - 100%)  Parameters below as of 17 Jun 2025 09:14 Patient On (Oxygen Delivery Method): room air   PHYSICAL EXAM:  Constitutional: NAD, awake and alert HEENT: PERR, EOMI, Normal Hearing, MMM Neck: Soft and supple Respiratory: Breath sounds are clear bilaterally, No wheezing, rales or rhonchi Cardiovascular: S1 and S2, regular rate and rhythm, no Murmurs, gallops or rubs Gastrointestinal: Bowel Sounds present, soft, nontender, nondistended, no guarding, no rebound Extremities: No peripheral edema Neurological: A/O x 3, no focal deficits in my limited exam   med/labs: Reviewed and interpreted    A/P: 41 y/o M with PMHx ETOH abuse, liver cirrhosis with hx bleeding EV s/p banding x5, HTN, anxiety, depression, ICH s/p craniotomy with evacuation of hygroma (11/2024), c/b obstructive hydrocephalus s/p  shunt placement as well as revision of craniotomy (12/2024), further c/b open scalp wound requiring debridement (1/28/25), s/p PEG tube now removed presents to ED c/o abdominal pain and distention, dark stools and lower extremity swelling, last drank EtOH day prior to admission (10-12 [place]ch[place] per day), had episode of emesis in the ED, GI consulted, taken for emergent EGD and admitted to ICU for further evaluation and management.   #Acute blood loss anemia 2/2 UGIB / varices due to cirrhosis: RESOLVED - s/p EGD 6/14, 4 non-bleeding large varices banded, stomach noted to be full of blood, no active bleeding - s/p 4 PRBCs, 1 FFP - s/p octreotide and empiric ceftriaxone - outpatient hepatology f/u and f/up MRI to r/o potential for malignancy/ liver nodularity - non- selective BB for the varices  #Hyperammonemia: - ammonia levels normalized - rifaximin   Dispo: - d/c home w/ outpatient f/u.

## 2025-07-02 NOTE — COUNSELING
[Hazards of at-risk alcohol use discussed] : Hazards of at-risk alcohol use discussed [Strategies to reduce or eliminate alcohol use discussed] : Strategies to reduce or eliminate alcohol use discussed [Quit Drinking] : Quit Drinking [FreeTextEntry1] : 5

## 2025-07-11 NOTE — REVIEW OF SYSTEMS
[Recent Weight Loss (___ Lbs)] : recent [unfilled] ~Ulb weight loss [Lower Ext Edema (lower leg swelling)] : lower extremity edema [Negative] : Heme/Lymph [Scleral Icterus (Yellow Eyes)] : no scleral icterus

## 2025-07-11 NOTE — ADDENDUM
[FreeTextEntry1] : I, Dr. Cruz, personally performed the evaluation and management (E/M) services for this established patient who presents today with (a) new problem(s)/exacerbation of (an) existing condition(s). That E/M includes conducting the examination, assessing all new/exacerbated conditions, and establishing a new plan of care. Today, my NPP, Monica Cervantes, was here to observe my evaluation and management services for this new problem/exacerbated condition to be followed going forward. Patient advised that he is putting his health at risk by continuing to drink alcohol.

## 2025-07-11 NOTE — HISTORY OF PRESENT ILLNESS
[FreeTextEntry1] : VLADIMIR PEREIRA is a 42-year-old M with a PMHx of polysubstance abuse, liver cirrhosis with variceal bleeding, anxiety, depression, recent ICH s/p craniotomy with evacuation of hygroma (11/2024), c/b obstructive hydrocephalus s/p  shunt placement as well as revision of craniotomy (12/2024), further c/b open scalp wound requiring debridement (1/28/25), s/p PEG tube now removed presenting to office for 2-week follow up.  Pt was seen 6/27 s/p hospitalization for bleeding varices and decompensated cirrhosis. At this point he was noncompliant with propranolol and xifaxan. He was also found to have +3 LE pitting edema, so diuretics were doubled.   Today, patient presents accompanied by mother who assists with caretaking. He reports overall feeling better, less pruritis and edema. He has remained non-compliant with Xifaxan as he feels this is what is causing his itching which at times keeps him up at night. He has, however, been compliant with Propranolol, TID cholestyramine, Spironolactone and Furosemide. He reports 3-4 soft BMs/day.   On physical exam there is no asterixis nor significant ascites noted, and there is some notable improvement in LE edema, down to +1/+2.  Patient appears to be mentating well, mother is at bedside for further history. She states patient has been alert and oriented. Patient admits to continued ETOH use, most recently today vodka. He reports overall he has been sleeping better so he has not been drinking as much volume, but still endorses daily use. He denies suicidal ideation and admits to being more depressed recently, is pending a f/u appointment with neuro to augment meds.  He was asked to complete stool studies at prior visit which he did not. He has repeat EGD/COL scheduled with MD Grider for 7/29 given recent banding.

## 2025-07-11 NOTE — ASSESSMENT
[FreeTextEntry1] : 42-year-old M with alcoholic cirrhosis presenting for 2-week follow up s/p diuretic med adjustment.  Plan: # Alcoholic cirrhosis # Itching: Discussed at length once again the importance of alcohol cessation with an emphasis on the significant risk of mortality with continued ETOH abuse. Also reviewed medications and rationales, as well as emphasis on the importance of taking Xifaxan for hepatic encephalopathy prophylaxis (this is not contributing to his itching, which is more likely d/t bile acid and is addressed with TID Cholestyramine. - Pleased with the 7 lb weight loss and decreased LE edema with new diuretic dosing. Will continue at current dose but obtain CBC, CMP, PT/INR for monitoring. Pt agrees to go to lab this week to do so.  - Still has not completed stool studies. Will drop sample when he gets blood drawn this week. - LIMIT Na+ intake to 2g/day. Use caution with processed foods and be sure to check labels.  Will see patient and mother back in office in 2-weeks for close follow up prior to ECO to review labs and ensure adherence to medication program as well as procedural prep.  Pt seen and discussed with MD Cruz.

## 2025-07-11 NOTE — PHYSICAL EXAM
[Alert] : alert [Normal Voice/Communication] : normal voice/communication [Healthy Appearing] : healthy appearing [No Acute Distress] : no acute distress [Sclera] : the sclera and conjunctiva were normal [Hearing Threshold Finger Rub Not [place]] : hearing was normal [Normal Lips/Gums] : the lips and gums were normal [Normal Appearance] : the appearance of the neck was normal [No Neck Mass] : no neck mass was observed [No Respiratory Distress] : no respiratory distress [No Acc Muscle Use] : no accessory muscle use [Respiration, Rhythm And Depth] : normal respiratory rhythm and effort [Heart Rate And Rhythm] : heart rate was normal and rhythm regular [+2] : edema: +2 [Bowel Sounds] : normal bowel sounds [Abdomen Tenderness] : non-tender [No Masses] : no abdominal mass palpated [Abdomen Soft] : soft [] : no hepatosplenomegaly [Abnormal Walk] : normal gait [Oriented To Time, Place, And Person] : oriented to person, place, and time [Ascites: ___] : no ascites

## 2025-07-24 NOTE — HISTORY OF PRESENT ILLNESS
[FreeTextEntry1] : Anthony Linares is a 41 yo M hx of polysubstance abuse with hx of GI bleed presents today for preprocedure consult. In recent weeks HGB is downtrending. Pt denies any signs of bleeding such as hematemesis, hematochezia, melena. Since last visit has stopped his BID dosing on diuretics, feels it hinders him sleeping. Is still not taking xifaxan because he feels it causes itching. Still admits to daily alcohol consumption. Recently has been having a hoarse voice with no other symptoms.

## 2025-07-24 NOTE — PHYSICAL EXAM
[Alert] : alert [Healthy Appearing] : healthy appearing [Sclera] : the sclera and conjunctiva were normal [Hearing Threshold Finger Rub Not [place]] : hearing was normal [Normal Appearance] : the appearance of the neck was normal [No Respiratory Distress] : no respiratory distress [Auscultation Breath Sounds / Voice Sounds] : lungs were clear to auscultation bilaterally [Heart Rate And Rhythm] : heart rate was normal and rhythm regular [Bowel Sounds] : normal bowel sounds [Abdomen Tenderness] : non-tender [Abdomen Soft] : soft [Abnormal Walk] : normal gait [Normal Color / Pigmentation] : normal skin color and pigmentation [Oriented To Time, Place, And Person] : oriented to person, place, and time

## 2025-07-24 NOTE — ASSESSMENT
[FreeTextEntry1] : Plan: Disucssed again at length importance of alcohol cessation for overall wellbeing as well as compliance to medication regimens to prevent worsening of symptoms. Pt states he will try. reviewed prep instructions provided prep sample. Pt will have labs drawn tomorrow to evaluate kidney function and CBC. Pt and mom at bedside expressed understanding, all questions answered.